# Patient Record
Sex: FEMALE | Race: WHITE | Employment: OTHER | URBAN - METROPOLITAN AREA
[De-identification: names, ages, dates, MRNs, and addresses within clinical notes are randomized per-mention and may not be internally consistent; named-entity substitution may affect disease eponyms.]

---

## 2020-01-02 ENCOUNTER — HOSPITAL ENCOUNTER (OUTPATIENT)
Age: 80
Discharge: HOME | End: 2020-01-02
Payer: MEDICARE

## 2020-01-02 ENCOUNTER — HOSPITAL ENCOUNTER (OUTPATIENT)
Dept: GENERAL RADIOLOGY | Age: 80
Discharge: HOME | End: 2020-01-02
Payer: MEDICARE

## 2020-01-02 ENCOUNTER — OFFICE VISIT (OUTPATIENT)
Dept: PRIMARY CARE CLINIC | Age: 80
End: 2020-01-02
Payer: MEDICARE

## 2020-01-02 VITALS
RESPIRATION RATE: 14 BRPM | SYSTOLIC BLOOD PRESSURE: 130 MMHG | HEIGHT: 61 IN | TEMPERATURE: 98 F | BODY MASS INDEX: 31.49 KG/M2 | DIASTOLIC BLOOD PRESSURE: 86 MMHG | OXYGEN SATURATION: 99 % | HEART RATE: 69 BPM | WEIGHT: 166.8 LBS

## 2020-01-02 DIAGNOSIS — R53.83 FATIGUE, UNSPECIFIED TYPE: ICD-10-CM

## 2020-01-02 DIAGNOSIS — R63.1 EXCESSIVE THIRST: ICD-10-CM

## 2020-01-02 DIAGNOSIS — R63.4 WEIGHT LOSS: ICD-10-CM

## 2020-01-02 DIAGNOSIS — Z83.3 FAMILY HISTORY OF DIABETES MELLITUS: ICD-10-CM

## 2020-01-02 DIAGNOSIS — F03.91 DEMENTIA WITH BEHAVIORAL DISTURBANCE, UNSPECIFIED DEMENTIA TYPE: ICD-10-CM

## 2020-01-02 LAB
A/G RATIO: 2.1 (ref 1.1–2.2)
ALBUMIN SERPL-MCNC: 4.8 G/DL (ref 3.4–5)
ALP BLD-CCNC: 87 U/L (ref 40–129)
ALT SERPL-CCNC: 16 U/L (ref 10–40)
ANION GAP SERPL CALCULATED.3IONS-SCNC: 15 MMOL/L (ref 3–16)
AST SERPL-CCNC: 21 U/L (ref 15–37)
BASOPHILS ABSOLUTE: 0 K/UL (ref 0–0.2)
BASOPHILS RELATIVE PERCENT: 0.6 %
BILIRUB SERPL-MCNC: 0.8 MG/DL (ref 0–1)
BILIRUBIN, POC: NORMAL
BLOOD URINE, POC: NORMAL
BUN BLDV-MCNC: 12 MG/DL (ref 7–20)
CALCIUM SERPL-MCNC: 9.6 MG/DL (ref 8.3–10.6)
CHLORIDE BLD-SCNC: 99 MMOL/L (ref 99–110)
CLARITY, POC: CLEAR
CO2: 26 MMOL/L (ref 21–32)
COLOR, POC: YELLOW
CREAT SERPL-MCNC: <0.5 MG/DL (ref 0.6–1.2)
EOSINOPHILS ABSOLUTE: 0.1 K/UL (ref 0–0.6)
EOSINOPHILS RELATIVE PERCENT: 0.8 %
GFR AFRICAN AMERICAN: >60
GFR NON-AFRICAN AMERICAN: >60
GLOBULIN: 2.3 G/DL
GLUCOSE BLD-MCNC: 96 MG/DL (ref 70–99)
GLUCOSE URINE, POC: NORMAL
HCT VFR BLD CALC: 41.6 % (ref 36–48)
HEMOGLOBIN: 13.8 G/DL (ref 12–16)
KETONES, POC: NORMAL
LEUKOCYTE EST, POC: NORMAL
LYMPHOCYTES ABSOLUTE: 1.7 K/UL (ref 1–5.1)
LYMPHOCYTES RELATIVE PERCENT: 22.3 %
MCH RBC QN AUTO: 30.5 PG (ref 26–34)
MCHC RBC AUTO-ENTMCNC: 33.3 G/DL (ref 31–36)
MCV RBC AUTO: 91.6 FL (ref 80–100)
MONOCYTES ABSOLUTE: 0.5 K/UL (ref 0–1.3)
MONOCYTES RELATIVE PERCENT: 6.8 %
NEUTROPHILS ABSOLUTE: 5.3 K/UL (ref 1.7–7.7)
NEUTROPHILS RELATIVE PERCENT: 69.5 %
NITRITE, POC: NORMAL
PDW BLD-RTO: 14.2 % (ref 12.4–15.4)
PH, POC: 6
PLATELET # BLD: 264 K/UL (ref 135–450)
PMV BLD AUTO: 9.4 FL (ref 5–10.5)
POTASSIUM SERPL-SCNC: 4.2 MMOL/L (ref 3.5–5.1)
PROTEIN, POC: NORMAL
RBC # BLD: 4.54 M/UL (ref 4–5.2)
SODIUM BLD-SCNC: 140 MMOL/L (ref 136–145)
SPECIFIC GRAVITY, POC: 1.03
TOTAL PROTEIN: 7.1 G/DL (ref 6.4–8.2)
TSH SERPL DL<=0.05 MIU/L-ACNC: 3.16 UIU/ML (ref 0.27–4.2)
UROBILINOGEN, POC: 0.2
VITAMIN B-12: 338 PG/ML (ref 211–911)
WBC # BLD: 7.6 K/UL (ref 4–11)

## 2020-01-02 PROCEDURE — 99204 OFFICE O/P NEW MOD 45 MIN: CPT | Performed by: INTERNAL MEDICINE

## 2020-01-02 PROCEDURE — 73562 X-RAY EXAM OF KNEE 3: CPT

## 2020-01-02 PROCEDURE — 81002 URINALYSIS NONAUTO W/O SCOPE: CPT | Performed by: INTERNAL MEDICINE

## 2020-01-02 RX ORDER — DONEPEZIL HYDROCHLORIDE 5 MG/1
5 TABLET, FILM COATED ORAL NIGHTLY
Qty: 30 TABLET | Refills: 1 | Status: SHIPPED | OUTPATIENT
Start: 2020-01-02 | End: 2020-01-28

## 2020-01-02 SDOH — HEALTH STABILITY: MENTAL HEALTH: HOW OFTEN DO YOU HAVE A DRINK CONTAINING ALCOHOL?: NEVER

## 2020-01-02 ASSESSMENT — PATIENT HEALTH QUESTIONNAIRE - PHQ9
1. LITTLE INTEREST OR PLEASURE IN DOING THINGS: 0
SUM OF ALL RESPONSES TO PHQ QUESTIONS 1-9: 0
SUM OF ALL RESPONSES TO PHQ QUESTIONS 1-9: 0
SUM OF ALL RESPONSES TO PHQ9 QUESTIONS 1 & 2: 0
2. FEELING DOWN, DEPRESSED OR HOPELESS: 0

## 2020-01-02 NOTE — PROGRESS NOTES
Vitals:    01/02/20 1035   BP: 130/86   Site: Right Upper Arm   Position: Sitting   Cuff Size: Medium Adult   Pulse: 69   Resp: 14   Temp: 98 °F (36.7 °C)   TempSrc: Oral   SpO2: 99%   Weight: 166 lb 12.8 oz (75.7 kg)   Height: 5' 1\" (1.549 m)     Body mass index is 31.52 kg/m². Physical Exam  Nursing note reviewed. Constitutional:       General: She is not in acute distress. Appearance: Normal appearance. She is well-developed. HENT:      Mouth/Throat:      Pharynx: Oropharynx is clear. Eyes:      General: Lids are normal.      Conjunctiva/sclera: Conjunctivae normal.      Pupils: Pupils are equal, round, and reactive to light. Neck:      Musculoskeletal: Neck supple. Thyroid: No thyromegaly. Vascular: No carotid bruit. Cardiovascular:      Rate and Rhythm: Normal rate and regular rhythm. Heart sounds: Normal heart sounds, S1 normal and S2 normal. No murmur. No friction rub. No gallop. Pulmonary:      Effort: Pulmonary effort is normal. No respiratory distress. Breath sounds: Normal breath sounds. No wheezing, rhonchi or rales. Abdominal:      General: Bowel sounds are normal. There is no distension. Palpations: Abdomen is soft. Tenderness: There is no tenderness. Musculoskeletal:      Right knee: She exhibits no swelling. No tenderness found. Left knee: She exhibits no swelling. No tenderness found. Right lower leg: No edema. Left lower leg: No edema. Comments: Crepitus bilateral knees   Lymphadenopathy:      Head:      Right side of head: No submandibular adenopathy. Left side of head: No submandibular adenopathy. Skin:     Comments: 12 x 12 mm raised mole left forehead   Neurological:      Mental Status: She is alert. She is disoriented.    Psychiatric:         Mood and Affect: Mood normal.           Results for POC orders placed in visit on 01/02/20   POCT Urinalysis no Micro   Result Value Ref Range    Color, UA Yellow

## 2020-01-02 NOTE — PATIENT INSTRUCTIONS
1. Pain in both knees, unspecified chronicity  - XR KNEE RIGHT (3 VIEWS); Future  - XR KNEE LEFT (3 VIEWS); Future  - diclofenac sodium 1 % GEL; Apply 2 g topically 2 times daily  Dispense: 100 g; Refill: 0    2. Fatigue, unspecified type  - CBC Auto Differential; Future  - Comprehensive Metabolic Panel; Future  - TSH without Reflex; Future  - Vitamin B12; Future  - multivitamin for Seniors once daily    3. Dementia with behavioral disturbance, unspecified dementia type (HCC)  - CBC Auto Differential; Future  - Comprehensive Metabolic Panel; Future  - TSH without Reflex; Future  - Vitamin B12; Future  -[facility]  - donepezil (ARICEPT) 5 MG tablet; Take 1 tablet by mouth nightly  Dispense: 30 tablet; Refill: 1  - Agree with [facility]    4. Excessive thirst  - Comprehensive Metabolic Panel; Future    5. Urinary frequency  -POCT Urinalysis no Micro    6. Change in skin mole  - Referral to [facility], MD, Dermatology, [facility]    7. Family history of diabetes mellitus  - Hemoglobin A1C; Future    8. Weight loss  - CBC Auto Differential; Future  - Comprehensive Metabolic Panel; Future  - TSH without Reflex;  Future  -Boost or Ensure nutritional shakes

## 2020-01-03 LAB
ESTIMATED AVERAGE GLUCOSE: 111.2 MG/DL
HBA1C MFR BLD: 5.5 %

## 2020-01-07 PROBLEM — M25.562 PAIN IN BOTH KNEES: Status: ACTIVE | Noted: 2020-01-07

## 2020-01-07 PROBLEM — R63.1 EXCESSIVE THIRST: Status: ACTIVE | Noted: 2020-01-07

## 2020-01-07 PROBLEM — R35.0 URINARY FREQUENCY: Status: ACTIVE | Noted: 2020-01-07

## 2020-01-07 PROBLEM — R63.4 WEIGHT LOSS: Status: ACTIVE | Noted: 2020-01-07

## 2020-01-07 PROBLEM — M25.561 PAIN IN BOTH KNEES: Status: ACTIVE | Noted: 2020-01-07

## 2020-01-07 PROBLEM — R53.83 FATIGUE: Status: ACTIVE | Noted: 2020-01-07

## 2020-01-07 PROBLEM — F03.918 DEMENTIA WITH BEHAVIORAL DISTURBANCE: Status: ACTIVE | Noted: 2020-01-07

## 2020-01-07 ASSESSMENT — ENCOUNTER SYMPTOMS
RHINORRHEA: 0
VOMITING: 0
SINUS PRESSURE: 0
DIARRHEA: 0
WHEEZING: 0
CHEST TIGHTNESS: 0
CONSTIPATION: 0
SORE THROAT: 0
NAUSEA: 0
TROUBLE SWALLOWING: 0
SHORTNESS OF BREATH: 0
COUGH: 0
BLOOD IN STOOL: 0
ABDOMINAL PAIN: 0

## 2020-01-13 ENCOUNTER — TELEPHONE (OUTPATIENT)
Dept: PRIMARY CARE CLINIC | Age: 80
End: 2020-01-13

## 2020-01-20 NOTE — TELEPHONE ENCOUNTER
Medication:   Requested Prescriptions     Pending Prescriptions Disp Refills    diclofenac sodium 1 % GEL [Pharmacy Med Name: DICLOFENAC SODIUM 1% GEL] 100 g 0     Sig: APPLY 2 G TOPICALLY 2 TIMES DAILY        Last Filled:      Patient Phone Number: [phone] (home)     Last appt: 1/2/2020   Next appt: 1/23/2020    Last OARRS: No flowsheet data found. Preferred Pharmacy:   [facility]/pharmacy [facility] [address], [address] [facility] [address] [address] [address]  Phone: [phone] Fax: [phone]    [facility] [address] - P [phone] - F [phone]  [address]   [place] [address]  Phone: [phone] Fax: [phone]

## 2020-01-23 ENCOUNTER — TELEPHONE (OUTPATIENT)
Dept: PRIMARY CARE CLINIC | Age: 80
End: 2020-01-23

## 2020-01-23 ENCOUNTER — OFFICE VISIT (OUTPATIENT)
Dept: PRIMARY CARE CLINIC | Age: 80
End: 2020-01-23
Payer: MEDICARE

## 2020-01-23 VITALS
BODY MASS INDEX: 32.02 KG/M2 | WEIGHT: 169.6 LBS | OXYGEN SATURATION: 95 % | HEIGHT: 61 IN | SYSTOLIC BLOOD PRESSURE: 106 MMHG | HEART RATE: 85 BPM | DIASTOLIC BLOOD PRESSURE: 80 MMHG

## 2020-01-23 PROBLEM — F02.818 ALZHEIMER'S DEMENTIA WITH BEHAVIORAL DISTURBANCE (HCC): Status: ACTIVE | Noted: 2020-01-23

## 2020-01-23 PROBLEM — G30.9 ALZHEIMER'S DEMENTIA WITH BEHAVIORAL DISTURBANCE (HCC): Status: ACTIVE | Noted: 2020-01-23

## 2020-01-23 PROCEDURE — 99213 OFFICE O/P EST LOW 20 MIN: CPT | Performed by: INTERNAL MEDICINE

## 2020-01-23 RX ORDER — DONEPEZIL HYDROCHLORIDE 10 MG/1
10 TABLET, FILM COATED ORAL DAILY
COMMUNITY
Start: 2020-01-22 | End: 2020-09-15 | Stop reason: SDUPTHER

## 2020-01-23 NOTE — TELEPHONE ENCOUNTER
1rst attempt contact.  VM left for POA
DASH/TLC (sodium and cholesterol restricted diet)/consistent carbohydrate (no snacks)/regular/low sodium

## 2020-01-24 RX ORDER — DONEPEZIL HYDROCHLORIDE 10 MG/1
10 TABLET, FILM COATED ORAL NIGHTLY
Qty: 30 TABLET | Refills: 5 | Status: SHIPPED | OUTPATIENT
Start: 2020-01-24

## 2020-01-24 RX ORDER — DONEPEZIL HYDROCHLORIDE 5 MG/1
TABLET, FILM COATED ORAL
Qty: 30 TABLET | Refills: 1 | OUTPATIENT
Start: 2020-01-24

## 2020-01-24 NOTE — TELEPHONE ENCOUNTER
Medication:   Requested Prescriptions     Pending Prescriptions Disp Refills    donepezil (ARICEPT) 5 MG tablet [Pharmacy Med Name: DONEPEZIL HCL 5 MG TABLET] 30 tablet 1     Sig: TAKE 1 TABLET BY MOUTH EVERY DAY AT NIGHT        Last Filled:      Patient Phone Number: [phone] (home)     Last appt: 1/23/2020   Next appt: Visit date not found    Last OARRS: No flowsheet data found. Preferred Pharmacy:   [facility]/pharmacy [facility] [address], [address] [address] [facility]  [address]  Phone: [phone] Fax: [phone]    [facility]/pharmacy [address] - P [phone] - F [phone]  [address]   [place] [address]  Phone: [phone] Fax: [phone]

## 2020-01-28 ASSESSMENT — ENCOUNTER SYMPTOMS
NAUSEA: 0
COUGH: 0
ABDOMINAL PAIN: 0
SORE THROAT: 0
SHORTNESS OF BREATH: 0
WHEEZING: 0
VOMITING: 0

## 2020-09-15 ENCOUNTER — HOSPITAL ENCOUNTER (OUTPATIENT)
Dept: GENERAL RADIOLOGY | Age: 80
Discharge: HOME | End: 2020-09-15
Payer: MEDICARE

## 2020-09-15 ENCOUNTER — TELEPHONE (OUTPATIENT)
Dept: DERMATOLOGY | Age: 80
End: 2020-09-15

## 2020-09-15 ENCOUNTER — HOSPITAL ENCOUNTER (OUTPATIENT)
Age: 80
Discharge: HOME | End: 2020-09-15
Payer: MEDICARE

## 2020-09-15 ENCOUNTER — OFFICE VISIT (OUTPATIENT)
Dept: PRIMARY CARE CLINIC | Age: 80
End: 2020-09-15
Payer: MEDICARE

## 2020-09-15 VITALS
BODY MASS INDEX: 34.96 KG/M2 | HEART RATE: 96 BPM | TEMPERATURE: 97.2 F | SYSTOLIC BLOOD PRESSURE: 142 MMHG | OXYGEN SATURATION: 97 % | WEIGHT: 185 LBS | DIASTOLIC BLOOD PRESSURE: 82 MMHG

## 2020-09-15 DIAGNOSIS — R11.0 NAUSEA: ICD-10-CM

## 2020-09-15 DIAGNOSIS — R10.816 EPIGASTRIC ABDOMINAL TENDERNESS WITHOUT REBOUND TENDERNESS: ICD-10-CM

## 2020-09-15 LAB
A/G RATIO: 1.8 (ref 1.1–2.2)
ALBUMIN SERPL-MCNC: 4.1 G/DL (ref 3.4–5)
ALP BLD-CCNC: 73 U/L (ref 40–129)
ALT SERPL-CCNC: 10 U/L (ref 10–40)
ANION GAP SERPL CALCULATED.3IONS-SCNC: 10 MMOL/L (ref 3–16)
AST SERPL-CCNC: 15 U/L (ref 15–37)
BASOPHILS ABSOLUTE: 0 K/UL (ref 0–0.2)
BASOPHILS RELATIVE PERCENT: 0.6 %
BILIRUB SERPL-MCNC: 0.6 MG/DL (ref 0–1)
BILIRUBIN, POC: NORMAL
BLOOD URINE, POC: NORMAL
BUN BLDV-MCNC: 9 MG/DL (ref 7–20)
CALCIUM SERPL-MCNC: 9.1 MG/DL (ref 8.3–10.6)
CHLORIDE BLD-SCNC: 102 MMOL/L (ref 99–110)
CLARITY, POC: CLEAR
CO2: 28 MMOL/L (ref 21–32)
COLOR, POC: YELLOW
CREAT SERPL-MCNC: 0.5 MG/DL (ref 0.6–1.2)
EOSINOPHILS ABSOLUTE: 0.1 K/UL (ref 0–0.6)
EOSINOPHILS RELATIVE PERCENT: 0.6 %
GFR AFRICAN AMERICAN: >60
GFR NON-AFRICAN AMERICAN: >60
GLOBULIN: 2.3 G/DL
GLUCOSE BLD-MCNC: 107 MG/DL (ref 70–99)
GLUCOSE URINE, POC: NORMAL
HCT VFR BLD CALC: 38 % (ref 36–48)
HEMOGLOBIN: 13.1 G/DL (ref 12–16)
KETONES, POC: NORMAL
LEUKOCYTE EST, POC: NORMAL
LIPASE: 44 U/L (ref 13–60)
LYMPHOCYTES ABSOLUTE: 2.2 K/UL (ref 1–5.1)
LYMPHOCYTES RELATIVE PERCENT: 25.9 %
MCH RBC QN AUTO: 31.3 PG (ref 26–34)
MCHC RBC AUTO-ENTMCNC: 34.5 G/DL (ref 31–36)
MCV RBC AUTO: 90.7 FL (ref 80–100)
MONOCYTES ABSOLUTE: 0.7 K/UL (ref 0–1.3)
MONOCYTES RELATIVE PERCENT: 8.2 %
NEUTROPHILS ABSOLUTE: 5.4 K/UL (ref 1.7–7.7)
NEUTROPHILS RELATIVE PERCENT: 64.7 %
NITRITE, POC: NORMAL
PDW BLD-RTO: 13.6 % (ref 12.4–15.4)
PH, POC: 6
PLATELET # BLD: 265 K/UL (ref 135–450)
PMV BLD AUTO: 9.2 FL (ref 5–10.5)
POTASSIUM SERPL-SCNC: 4.1 MMOL/L (ref 3.5–5.1)
PROTEIN, POC: NORMAL
RBC # BLD: 4.19 M/UL (ref 4–5.2)
SODIUM BLD-SCNC: 140 MMOL/L (ref 136–145)
SPECIFIC GRAVITY, POC: 1.02
TOTAL PROTEIN: 6.4 G/DL (ref 6.4–8.2)
UROBILINOGEN, POC: 0.2
WBC # BLD: 8.4 K/UL (ref 4–11)

## 2020-09-15 PROCEDURE — 72100 X-RAY EXAM L-S SPINE 2/3 VWS: CPT

## 2020-09-15 PROCEDURE — 72070 X-RAY EXAM THORAC SPINE 2VWS: CPT

## 2020-09-15 PROCEDURE — 81002 URINALYSIS NONAUTO W/O SCOPE: CPT | Performed by: INTERNAL MEDICINE

## 2020-09-15 PROCEDURE — 99214 OFFICE O/P EST MOD 30 MIN: CPT | Performed by: INTERNAL MEDICINE

## 2020-09-15 RX ORDER — DONEPEZIL HYDROCHLORIDE 10 MG/1
10 TABLET, FILM COATED ORAL DAILY
Qty: 30 TABLET | Refills: 3 | Status: SHIPPED | OUTPATIENT
Start: 2020-09-15 | End: 2020-10-15 | Stop reason: SDUPTHER

## 2020-09-15 RX ORDER — FAMOTIDINE 20 MG/1
20 TABLET, FILM COATED ORAL 2 TIMES DAILY
Qty: 60 TABLET | Refills: 0 | Status: SHIPPED | OUTPATIENT
Start: 2020-09-15 | End: 2020-10-10

## 2020-09-15 SDOH — ECONOMIC STABILITY: FOOD INSECURITY: WITHIN THE PAST 12 MONTHS, YOU WORRIED THAT YOUR FOOD WOULD RUN OUT BEFORE YOU GOT MONEY TO BUY MORE.: NEVER TRUE

## 2020-09-15 SDOH — ECONOMIC STABILITY: FOOD INSECURITY: WITHIN THE PAST 12 MONTHS, THE FOOD YOU BOUGHT JUST DIDN'T LAST AND YOU DIDN'T HAVE MONEY TO GET MORE.: NEVER TRUE

## 2020-09-15 SDOH — ECONOMIC STABILITY: INCOME INSECURITY: HOW HARD IS IT FOR YOU TO PAY FOR THE VERY BASICS LIKE FOOD, HOUSING, MEDICAL CARE, AND HEATING?: NOT HARD AT ALL

## 2020-09-15 SDOH — ECONOMIC STABILITY: TRANSPORTATION INSECURITY
IN THE PAST 12 MONTHS, HAS LACK OF TRANSPORTATION KEPT YOU FROM MEETINGS, WORK, OR FROM GETTING THINGS NEEDED FOR DAILY LIVING?: NO

## 2020-09-15 SDOH — ECONOMIC STABILITY: TRANSPORTATION INSECURITY
IN THE PAST 12 MONTHS, HAS THE LACK OF TRANSPORTATION KEPT YOU FROM MEDICAL APPOINTMENTS OR FROM GETTING MEDICATIONS?: NO

## 2020-09-15 NOTE — PROGRESS NOTES
frequency and hematuria. Musculoskeletal: Positive for back pain. Negative for arthralgias, gait problem, joint swelling and myalgias. Skin: Negative for rash. Neurological: Negative for dizziness, tremors, syncope, weakness, light-headedness, numbness and headaches. Psychiatric/Behavioral: Positive for confusion. Negative for dysphoric mood and sleep disturbance. The patient is not nervous/anxious. Allergies   Allergen Reactions    Sulfa Antibiotics Itching     Outpatient Medications Marked as Taking for the 9/15/20 encounter (Office Visit) with Aryan Rahman MD   Medication Sig Dispense Refill    donepezil (ARICEPT) 10 MG tablet Take 1 tablet by mouth nightly 30 tablet 5    diclofenac sodium 1 % GEL APPLY 2 G TOPICALLY 2 TIMES DAILY 100 g 2         Vitals:    09/15/20 0907 09/15/20 0920   BP: (!) 142/86 (!) 142/82   Site: Left Upper Arm Left Upper Arm   Position: Sitting Sitting   Cuff Size: Large Adult Large Adult   Pulse: 96    Temp: 97.2 °F (36.2 °C)    TempSrc: Infrared    SpO2: 97%    Weight: 185 lb (83.9 kg)      Body mass index is 34.96 kg/m². Physical Exam  Nursing note reviewed. Constitutional:       General: She is not in acute distress. Appearance: Normal appearance. She is well-developed. HENT:      Mouth/Throat:      Pharynx: Oropharynx is clear. Eyes:      General: Lids are normal.      Extraocular Movements: Extraocular movements intact. Conjunctiva/sclera: Conjunctivae normal.      Pupils: Pupils are equal, round, and reactive to light. Neck:      Musculoskeletal: Neck supple. Thyroid: No thyromegaly. Vascular: No carotid bruit. Cardiovascular:      Rate and Rhythm: Normal rate and regular rhythm. Heart sounds: Normal heart sounds, S1 normal and S2 normal. No murmur. No friction rub. No gallop. Pulmonary:      Effort: Pulmonary effort is normal. No respiratory distress. Breath sounds: Normal breath sounds.  No wheezing, rhonchi or rales.   Abdominal:      General: Bowel sounds are normal. There is no distension. Palpations: Abdomen is soft. Tenderness: There is no abdominal tenderness. Musculoskeletal:      Thoracic back: She exhibits no tenderness and no spasm. Lumbar back: She exhibits normal range of motion, no tenderness and no spasm. Right lower leg: No edema. Left lower leg: No edema. Lymphadenopathy:      Head:      Right side of head: No submandibular adenopathy. Left side of head: No submandibular adenopathy. Skin:     Comments: Toenails on both feet are long    Large mole left forhead   Neurological:      Mental Status: She is alert and oriented to person, place, and time. Gait: Gait normal.      Deep Tendon Reflexes: Reflexes are normal and symmetric. Psychiatric:         Attention and Perception: Attention normal.         Mood and Affect: Mood normal.         Cognition and Memory: Cognition is impaired. Memory is impaired. Results for POC orders placed in visit on 09/15/20   POCT Urinalysis no Micro   Result Value Ref Range    Color, UA yellow     Clarity, UA clear     Glucose, UA POC neg     Bilirubin, UA neg     Ketones, UA neg     Spec Grav, UA 1.025     Blood, UA POC neg     pH, UA 6.0     Protein, UA POC neg     Urobilinogen, UA 0.2     Leukocytes, UA trace     Nitrite, UA neg      Lab Review   No visits with results within 6 Month(s) from this visit.    Latest known visit with results is:   Orders Only on 01/02/2020   Component Date Value    Hemoglobin A1C 01/02/2020 5.5     eAG 01/02/2020 111.2     Vitamin B-12 01/02/2020 338     TSH 01/02/2020 3.16     Sodium 01/02/2020 140     Potassium 01/02/2020 4.2     Chloride 01/02/2020 99     CO2 01/02/2020 26     Anion Gap 01/02/2020 15     Glucose 01/02/2020 96     BUN 01/02/2020 12     CREATININE 01/02/2020 <0.5*    GFR Non- 01/02/2020 >60     GFR  01/02/2020 >60     Calcium 01/02/2020 9.6     Total Protein 01/02/2020 7.1     Alb 01/02/2020 4.8     Albumin/Globulin Ratio 01/02/2020 2.1     Total Bilirubin 01/02/2020 0.8     Alkaline Phosphatase 01/02/2020 87     ALT 01/02/2020 16     AST 01/02/2020 21     Globulin 01/02/2020 2.3     WBC 01/02/2020 7.6     RBC 01/02/2020 4.54     Hemoglobin 01/02/2020 13.8     Hematocrit 01/02/2020 41.6     MCV 01/02/2020 91.6     MCH 01/02/2020 30.5     MCHC 01/02/2020 33.3     RDW 01/02/2020 14.2     Platelets 62/78/8211 264     MPV 01/02/2020 9.4     Neutrophils % 01/02/2020 69.5     Lymphocytes % 01/02/2020 22.3     Monocytes % 01/02/2020 6.8     Eosinophils % 01/02/2020 0.8     Basophils % 01/02/2020 0.6     Neutrophils Absolute 01/02/2020 5.3     Lymphocytes Absolute 01/02/2020 1.7     Monocytes Absolute 01/02/2020 0.5     Eosinophils Absolute 01/02/2020 0.1     Basophils Absolute 01/02/2020 0.0          Assessment/Plan     1. Alzheimer's dementia with behavioral disturbance, unspecified timing of dementia [facility] ([facility]  - progressing  - Continue donepezil (ARICEPT) 10 MG tablet; Take 1 tablet by mouth daily  Dispense: 30 tablet; Refill: 3  -Family to continue to look for long term placement    2. Mid back pain  - XR THORACIC SPINE (2 VIEWS); Future  - Tylenol (Acetaminophen) 650mg 3 times daily as needed    3. Right low back pain, unspecified chronicity, unspecified whether sciatica present  - XR LUMBAR SPINE (2-3 VIEWS); Future  - POCT Urinalysis no Micro  - Tylenol (Acetaminophen) 650mg 3 times daily as needed    4. Heartburn  - famotidine (PEPCID) 20 MG tablet; Take 1 tablet by mouth 2 times daily  Dispense: 60 tablet; Refill: 0  - stop aspirin    5. Nausea  - CBC Auto Differential; Future  - Comprehensive Metabolic Panel; Future  - Lipase; Future  - POCT Urinalysis no Micro  - famotidine (PEPCID) 20 MG tablet; Take 1 tablet by mouth 2 times daily  Dispense: 60 tablet; Refill: 0  - US ABDOMEN COMPLETE; Future    6. Change in skin mole  -Referral to [facility], MD, Dermatology, [place]    7. Epigastric abdominal tenderness without rebound tenderness  - CBC Auto Differential; Future  - Comprehensive Metabolic Panel; Future  - US ABDOMEN COMPLETE; Future    8. Long toenail  - long toenails  -Referral to [facility], Flex Lopez DPM, Podiatry, Tamanna Holguin    9. Elevated blood pressure reading  - blood pressure is a little elevated  - patient will have BP checked at next visit in 2 weeks        Return in about 2 weeks (around 9/29/2020) for back pain, heartburn, nausea, and abdominal tenderness.

## 2020-09-15 NOTE — PATIENT INSTRUCTIONS
1. Alzheimer's dementia with behavioral disturbance, unspecified timing of dementia onset ([facility]  - progressing  - Continue donepezil (ARICEPT) 10 MG tablet; Take 1 tablet by mouth daily  Dispense: 30 tablet; Refill: 3  -Family to continue to look for long term placement    2. Mid back pain  - XR THORACIC SPINE (2 VIEWS); Future  - Tylenol (Acetaminophen) 650mg 3 times daily as needed    3. Right low back pain, unspecified chronicity, unspecified whether sciatica present  - XR LUMBAR SPINE (2-3 VIEWS); Future  - POCT Urinalysis no Micro  - Tylenol (Acetaminophen) 650mg 3 times daily as needed    4. Heartburn  - famotidine (PEPCID) 20 MG tablet; Take 1 tablet by mouth 2 times daily  Dispense: 60 tablet; Refill: 0  - stop aspirin    5. Nausea  - CBC Auto Differential; Future  - Comprehensive Metabolic Panel; Future  - Lipase; Future  - POCT Urinalysis no Micro  - famotidine (PEPCID) 20 MG tablet; Take 1 tablet by mouth 2 times daily  Dispense: 60 tablet; Refill: 0  - US ABDOMEN COMPLETE; Future    6. Change in skin mole  -Referral to [facility], MD, Dermatology, [facility]    7. Epigastric abdominal tenderness without rebound tenderness  - CBC Auto Differential; Future  - Comprehensive Metabolic Panel; Future  - US ABDOMEN COMPLETE; Future    8.  Long toenail  - long toenails  -Referral to [facility], Alanna Manning DPM, Podiatry, [facility]    9. Elevated blood pressure reading  - blood pressure is a little elevated  - patient will have BP checked at next visit in 2 weeks

## 2020-09-15 NOTE — TELEPHONE ENCOUNTER
Patient daughter called she seen Dr. Yulia Gomez and he referred her to us she has a mole that changing. She would be a new patient and I told Daughter Queta Barker that I would send a message through and see if Dr. Horton can see her sooner.   Patient's Daughter is Queta Barker her # is [phone]

## 2020-09-16 LAB — URINE CULTURE, ROUTINE: NORMAL

## 2020-09-21 ASSESSMENT — ENCOUNTER SYMPTOMS
SORE THROAT: 0
TROUBLE SWALLOWING: 0
WHEEZING: 0
VOMITING: 0
SHORTNESS OF BREATH: 0
CONSTIPATION: 0
SINUS PRESSURE: 0
DIARRHEA: 0
ABDOMINAL PAIN: 0
RHINORRHEA: 0
BACK PAIN: 1
NAUSEA: 1
BLOOD IN STOOL: 0
CHEST TIGHTNESS: 0
COUGH: 0

## 2020-09-22 ENCOUNTER — HOSPITAL ENCOUNTER (OUTPATIENT)
Dept: ULTRASOUND IMAGING | Age: 80
Discharge: HOME | End: 2020-09-22
Payer: MEDICARE

## 2020-09-22 PROCEDURE — 76700 US EXAM ABDOM COMPLETE: CPT

## 2020-09-23 ENCOUNTER — TELEPHONE (OUTPATIENT)
Dept: PRIMARY CARE CLINIC | Age: 80
End: 2020-09-23

## 2020-09-23 NOTE — TELEPHONE ENCOUNTER
Patients son returned a call from yesterday, he said to go ahead and call his cell phone number.      [phone]    Thank you

## 2020-09-29 ENCOUNTER — OFFICE VISIT (OUTPATIENT)
Dept: PRIMARY CARE CLINIC | Age: 80
End: 2020-09-29
Payer: MEDICARE

## 2020-09-29 VITALS
HEART RATE: 91 BPM | RESPIRATION RATE: 16 BRPM | OXYGEN SATURATION: 97 % | TEMPERATURE: 98.2 F | BODY MASS INDEX: 34.31 KG/M2 | DIASTOLIC BLOOD PRESSURE: 88 MMHG | WEIGHT: 181.6 LBS | SYSTOLIC BLOOD PRESSURE: 134 MMHG

## 2020-09-29 LAB — HBA1C MFR BLD: 5.7 %

## 2020-09-29 PROCEDURE — 83036 HEMOGLOBIN GLYCOSYLATED A1C: CPT | Performed by: INTERNAL MEDICINE

## 2020-09-29 PROCEDURE — G0008 ADMIN INFLUENZA VIRUS VAC: HCPCS | Performed by: INTERNAL MEDICINE

## 2020-09-29 PROCEDURE — 99214 OFFICE O/P EST MOD 30 MIN: CPT | Performed by: INTERNAL MEDICINE

## 2020-09-29 PROCEDURE — 90654 INFLUENZA, QUADV, ADJUVANTED, 65 YRS +, IM, PF, PREFILL SYR, 0.5ML (FLUAD): CPT | Performed by: INTERNAL MEDICINE

## 2020-09-29 RX ORDER — ALENDRONATE SODIUM 70 MG/1
70 TABLET ORAL
Qty: 4 TABLET | Refills: 3 | Status: SHIPPED | OUTPATIENT
Start: 2020-09-29 | End: 2021-02-01

## 2020-09-29 NOTE — PROGRESS NOTES
Vaccine Information Sheet, \"Influenza - Inactivated\"  given to [employer], or parent/legal guardian of  [employer] and verbalized understanding. Patient responses:    Have you ever had a reaction to a flu vaccine? No  Do you have any current illness? No  Have you ever had Guillian Buffalo Syndrome? No  Do you have a serious allergy to any of the follow: Neomycin, Polymyxin, Thimerosal, eggs or egg products? No    Flu vaccine given per order. Please see immunization tab. Risks and benefits explained. Current VIS given.

## 2020-09-29 NOTE — TELEPHONE ENCOUNTER
Daughter in law Nancy Maloney was called back due to no show policy the npt will not be seen was given name and # to [address] or instructed to contact insurance

## 2020-09-29 NOTE — PROGRESS NOTES
[facility]   Date ofBirth:  1940    Date of Visit:  9/29/2020    Chief Complaint   Patient presents with    Back Pain    Heartburn    Nausea       HPI  Back pain- Pt has right low back pain and mid back pain. Back pain is dull achy and intermittent. Pt states she has been taking Tylenol once daily which helps. Pt had xrays done which show degenerative disc disease lumbar spine and thoracic spine and compression deformity of L2 vertebrae. Heartburn- Pt takes Famotidine 20mg po bid. Pt states the Famotidine helps. Pt has stopped aspirin. Nausea- Pt states her nausea is better. Pt denies vomiting and abdominal pain. Pt had an abdominal ultrasound done which incidentally shows kidney cysts and angiomyolipoma of right kidney. Disorder of bone density and structure- Pt's xrays show osteopenia and compression. Pt has a history of osteoporosis and took Alendronate years ago. Review of Systems   Constitutional: Negative for chills, fatigue and fever. HENT: Negative for congestion, postnasal drip, rhinorrhea, sinus pressure and sore throat. Eyes: Negative for visual disturbance. Respiratory: Negative for cough, chest tightness, shortness of breath and wheezing. Cardiovascular: Negative for chest pain, palpitations and leg swelling. Gastrointestinal: Positive for nausea. Negative for abdominal pain, blood in stool, constipation, diarrhea and vomiting. Genitourinary: Negative for dysuria, frequency and hematuria. Musculoskeletal: Positive for back pain. Negative for arthralgias, gait problem and myalgias. Skin: Negative for rash. Neurological: Negative for dizziness, tremors, syncope, weakness, light-headedness, numbness and headaches. Psychiatric/Behavioral: Negative for dysphoric mood and sleep disturbance. The patient is not nervous/anxious.         Allergies   Allergen Reactions    Sulfa Antibiotics Itching     Outpatient Medications Marked as Taking for the 9/29/20 encounter (Office Visit) with Purvi Grissom MD   Medication Sig Dispense Refill    alendronate (FOSAMAX) 70 MG tablet Take 1 tablet by mouth every 7 days 4 tablet 3    donepezil (ARICEPT) 10 MG tablet Take 1 tablet by mouth daily 30 tablet 3    famotidine (PEPCID) 20 MG tablet Take 1 tablet by mouth 2 times daily 60 tablet 0    donepezil (ARICEPT) 10 MG tablet Take 1 tablet by mouth nightly 30 tablet 5    diclofenac sodium 1 % GEL APPLY 2 G TOPICALLY 2 TIMES DAILY 100 g 2         Vitals:    09/29/20 1148   BP: 134/88   Pulse: 91   Resp: 16   Temp: 98.2 °F (36.8 °C)   SpO2: 97%   Weight: 181 lb 9.6 oz (82.4 kg)     Body mass index is 34.31 kg/m². Physical Exam  Nursing note reviewed. Constitutional:       General: She is not in acute distress. Appearance: Normal appearance. She is well-developed. HENT:      Mouth/Throat:      Pharynx: Oropharynx is clear. Eyes:      General: Lids are normal.      Extraocular Movements: Extraocular movements intact. Conjunctiva/sclera: Conjunctivae normal.      Pupils: Pupils are equal, round, and reactive to light. Neck:      Musculoskeletal: Neck supple. Thyroid: No thyromegaly. Vascular: No carotid bruit. Cardiovascular:      Rate and Rhythm: Normal rate and regular rhythm. Heart sounds: Normal heart sounds, S1 normal and S2 normal. No murmur. No friction rub. No gallop. Pulmonary:      Effort: Pulmonary effort is normal. No respiratory distress. Breath sounds: Normal breath sounds. No wheezing, rhonchi or rales. Abdominal:      General: Bowel sounds are normal. There is no distension. Palpations: Abdomen is soft. Tenderness: There is no abdominal tenderness. Musculoskeletal:      Thoracic back: She exhibits tenderness. She exhibits no spasm. Lumbar back: She exhibits tenderness. She exhibits normal range of motion and no spasm. Right lower leg: No edema. Left lower leg: No edema. Lymphadenopathy:      Head:      Right side of head: No submandibular adenopathy. Left side of head: No submandibular adenopathy. Neurological:      Mental Status: She is alert and oriented to person, place, and time. Gait: Gait normal.      Deep Tendon Reflexes: Reflexes are normal and symmetric.    Psychiatric:         Attention and Perception: Attention normal.         Mood and Affect: Mood normal.           Results for POC orders placed in visit on 09/29/20   POCT glycosylated hemoglobin (Hb A1C)   Result Value Ref Range    Hemoglobin A1C 5.7 %     Lab Review   Orders Only on 09/15/2020   Component Date Value    Lipase 09/15/2020 44.0     Sodium 09/15/2020 140     Potassium 09/15/2020 4.1     Chloride 09/15/2020 102     CO2 09/15/2020 28     Anion Gap 09/15/2020 10     Glucose 09/15/2020 107*    BUN 09/15/2020 9     CREATININE 09/15/2020 0.5*    GFR Non- 09/15/2020 >60     GFR  09/15/2020 >60     Calcium 09/15/2020 9.1     Total Protein 09/15/2020 6.4     Alb 09/15/2020 4.1     Albumin/Globulin Ratio 09/15/2020 1.8     Total Bilirubin 09/15/2020 0.6     Alkaline Phosphatase 09/15/2020 73     ALT 09/15/2020 10     AST 09/15/2020 15     Globulin 09/15/2020 2.3     WBC 09/15/2020 8.4     RBC 09/15/2020 4.19     Hemoglobin 09/15/2020 13.1     Hematocrit 09/15/2020 38.0     MCV 09/15/2020 90.7     MCH 09/15/2020 31.3     MCHC 09/15/2020 34.5     RDW 09/15/2020 13.6     Platelets 39/86/0087 265     MPV 09/15/2020 9.2     Neutrophils % 09/15/2020 64.7     Lymphocytes % 09/15/2020 25.9     Monocytes % 09/15/2020 8.2     Eosinophils % 09/15/2020 0.6     Basophils % 09/15/2020 0.6     Neutrophils Absolute 09/15/2020 5.4     Lymphocytes Absolute 09/15/2020 2.2     Monocytes Absolute 09/15/2020 0.7     Eosinophils Absolute 09/15/2020 0.1     Basophils Absolute 09/15/2020 0.0    Office Visit on 09/15/2020   Component Date Value    Color, UA 09/15/2020 yellow     Clarity, UA 09/15/2020 clear     Glucose, UA POC 09/15/2020 neg     Bilirubin, UA 09/15/2020 neg     Ketones, UA 09/15/2020 neg     Spec Grav, UA 09/15/2020 1.025     Blood, UA POC 09/15/2020 neg     pH, UA 09/15/2020 6.0     Protein, UA POC 09/15/2020 neg     Urobilinogen, UA 09/15/2020 0.2     Leukocytes, UA 09/15/2020 trace     Nitrite, UA 09/15/2020 neg     Urine Culture, Routine 09/15/2020 No growth at 18 to 36 hours          Assessment/Plan     1. Right low back pain, unspecified chronicity, unspecified whether sciatica present  - Tylenol 650mg 3 times daily as needed  - Referral to Vianney Ng MD, Spine Surgery, [facility]    2. Mid back pain  - Tylenol 650mg 3 times daily as needed  - Referral to Vianney Ng MD, Spine Surgery, [facility]    3. Angiomyolipoma of right kidney  - seen on abdominal ultrasound  - [facility]    4. Hyperglycemia  - POCT glycosylated hemoglobin (Hb A1C)    5. Kidney cysts  - [facility]chal[facility]    6. Degenerative disc disease, thoracic  - Tylenol 650mg 3 times daily as needed  - [facility] [place]    7. Degenerative disc disease, lumbar  - Tylenol 650mg 3 times daily as needed  - [facility] [place]    8. Compression deformity of vertebra  - history of osteoporosis  - patient has taken Alendronate in the past years ago  - Start Alendronate 70mg once a week  - [facility] [place]     9. Disorder of bone density and structure, unspecified  - history of osteoporosis  - patient has taken Alendronate in the past years ago  - Start Alendronate 70mg once a week  - DEXA BONE DENSITY AXIAL SKELETON; Future    10. Heartburn  -improved  -Continue Famotidine 20mg 2 times daily    11. Nausea  -better  -labs and abdominal ultrasound are unremarkable for cause    12. Flu vaccine need  - INFLUENZA, QUADV, ADJUVANTED, 65 YRS =, IM, PF, PREFILL SYR, 0.5ML (FLUAD) given      Discussed medications with patient, who voiced understanding of their use and indications. All questions answered. Return in about 6 weeks (around 11/10/2020) for Medicare AWV.

## 2020-09-29 NOTE — PATIENT INSTRUCTIONS
Patient Education        Influenza (Flu) Vaccine (Inactivated or Recombinant): What You Need to Know  Why get vaccinated? Influenza vaccine can prevent influenza (flu). Flu is a contagious disease that spreads around the [place] every year, usually between October and May. Anyone can get the flu, but it is more dangerous for some people. Infants and young children, people 72years of age and older, pregnant women, and people with certain health conditions or a weakened immune system are at greatest risk of flu complications. Pneumonia, bronchitis, sinus infections and ear infections are examples of flu-related complications. If you have a medical condition, such as heart disease, cancer or diabetes, flu can make it worse. Flu can cause fever and chills, sore throat, muscle aches, fatigue, cough, headache, and runny or stuffy nose. Some people may have vomiting and diarrhea, though this is more common in children than adults. Each year, thousands of people in the [facility] die from flu, and many more are hospitalized. Flu vaccine prevents millions of illnesses and flu-related visits to the doctor each year. Influenza vaccine  CDC recommends everyone 10months of age and older get vaccinated every flu season. Children 6 months through 6years of age may need 2 doses during a single flu season. Everyone else needs only 1 dose each flu season. It takes about 2 weeks for protection to develop after vaccination. There are many flu viruses, and they are always changing. Each year a new flu vaccine is made to protect against three or four viruses that are likely to cause disease in the upcoming flu season. Even when the vaccine doesn't exactly match these viruses, it may still provide some protection. Influenza vaccine does not cause flu. Influenza vaccine may be given at the same time as other vaccines.   Talk with your health care provider  Tell your vaccine provider if the person getting the Consuelo Fritz MD, [facility], [facility]    4. Hyperglycemia  - POCT glycosylated hemoglobin (Hb A1C)    5. Kidney cysts  - Referral to Marisela Barrientos MD, [facility], [facility]    6. Degenerative disc disease, thoracic  - Tylenol 650mg 3 times daily as needed  - Referral to Abraham Ng MD, Spine Surgery, [facility]    7. Degenerative disc disease, lumbar  - Tylenol 650mg 3 times daily as needed  - Referral to Abraham Ng MD, Spine Surgery, [facility]    8. Compression deformity of vertebra  - history of osteoporosis  - patient has taken Alendronate in the past years ago  - Start Alendronate 70mg once a week  - Referral to Abraham Ng MD, Spine Surgery, [facility]     9. Disorder of bone density and structure, unspecified  - history of osteoporosis  - patient has taken Alendronate in the past years ago  - Start Alendronate 70mg once a week  - DEXA BONE DENSITY AXIAL SKELETON; Future    10. Heartburn  -improved  -Continue Famotidine 20mg 2 times daily    11. Nausea  -better  -labs and abdominal ultrasound are unremarkable for cause    12.  Flu vaccine need  - INFLUENZA, QUADV, ADJUVANTED, 65 YRS =, IM, PF, PREFILL SYR, 0.5ML (FLUAD) given

## 2020-09-30 ASSESSMENT — ENCOUNTER SYMPTOMS
DIARRHEA: 0
NAUSEA: 1
BACK PAIN: 1
RHINORRHEA: 0
SHORTNESS OF BREATH: 0
SINUS PRESSURE: 0
CHEST TIGHTNESS: 0
WHEEZING: 0
SORE THROAT: 0
VOMITING: 0
ABDOMINAL PAIN: 0
COUGH: 0
CONSTIPATION: 0
BLOOD IN STOOL: 0

## 2020-10-10 RX ORDER — FAMOTIDINE 20 MG/1
TABLET, FILM COATED ORAL
Qty: 60 TABLET | Refills: 3 | Status: SHIPPED | OUTPATIENT
Start: 2020-10-10

## 2020-10-10 NOTE — TELEPHONE ENCOUNTER
Medication:   Requested Prescriptions     Pending Prescriptions Disp Refills    famotidine (PEPCID) 20 MG tablet [Pharmacy Med Name: FAMOTIDINE 20 MG TABLET] 60 tablet 0     Sig: TAKE 1 TABLET BY MOUTH TWICE A DAY     Last Filled: 9.15.20    Last appt: 9/29/2020   Next appt: 11/10/2020    Last OARRS: No flowsheet data found.

## 2020-10-15 ENCOUNTER — OFFICE VISIT (OUTPATIENT)
Dept: ORTHOPEDIC SURGERY | Age: 80
End: 2020-10-15
Payer: MEDICARE

## 2020-10-15 VITALS — RESPIRATION RATE: 12 BRPM | WEIGHT: 181 LBS | TEMPERATURE: 97.4 F | BODY MASS INDEX: 34.17 KG/M2 | HEIGHT: 61 IN

## 2020-10-15 PROCEDURE — 99203 OFFICE O/P NEW LOW 30 MIN: CPT | Performed by: PHYSICIAN ASSISTANT

## 2020-10-15 NOTE — PROGRESS NOTES
New Patient: SPINE    Referring Provider:  Marisela Fried MD    Chief Complaint   Patient presents with    Lower Back Pain     NP, LSP       HISTORY OF PRESENT ILLNESS:      · The patient is being sent at the request of Marisela Fried MD in consultation as a new spine patient for low back pain. The patient is a 79 y.o. female whom reports symptoms for 1 year. Symptoms progressed over the last  1 year. Patient reports there was not a significant event to cause the symptoms. Today discomfort is report at 4 out of 10, describing it as aching. Symptoms are aggravated by: bending. Patient has undergone recent treatment including, Tylenol. Patient denies previous lumbar spine surgery. · The patient presents today as a new patient with lower back pain. The patient presents in the office with her son as the patient does have Alzheimer's and majority of the history does come from him today. The patient is having mid back pain as well and describes that she does not have any radicular features at this time. The patient and son describe that this pain has been present for the past year and has consistently worsened. The patient describes that bending particularly bending backwards increases her pain significantly. The patient has tried over-the-counter medications including Tylenol. She has not tried any physical therapy although due to the patient's mental status and overall state of being I do not believe that this would be benefit for her.     Pain Assessment  Location of Pain: Back(LSP)  Location Modifiers: Posterior  Severity of Pain: 4  Quality of Pain: Aching  Duration of Pain: Persistent  Frequency of Pain: Constant  Aggravating Factors: Bending  Limiting Behavior: Yes  Relieving Factors: Rest, Other (Comment)(Tylenol)  Result of Injury: No  Work-Related Injury: No  Are there other pain locations you wish to document?: No      Associated signs and symptoms:   Neurogenic bowel or bladder symptoms: no   Perceived weakness:  yes   Difficulty walking:  yes    Recent Imaging (within past one year)   Xrays: yes   MRI or CT of spine: no    Current/Past Treatment:   · Physical Therapy:  none  · Chiropractic:  none  · Injection:  none  · Medications:   NSAIDS:  yes   Muscle relaxer:  none   Steriods:  none   Neuropathic medications:  none   Opioids:  none  · Previous surgery:  no  · Previous surgical consult:  no  · Other:  · Infection control  · Tested positive for MRSA in past 12 months:  no  · Tested positive for MSSA \"staph infection\" in past 12 months: no  · Tested positive for VRE (Vancomycin Resistant Enterococci) in past 12 months:   no  · Currently on any antibiotics for an infection: no  · Anticoagulants:  · On a blood thinner:  no   · Any history of bleeding disorder: no   · MRI Contraindication: no   · Previous Pain Management: no   · Goal for treatment: Reduce pain / improve function  · How long can you stand? 30 minutes    Sit? No limitations     Walk? 30 minutes       Past medical, surgical, social and family history reviewed with the patient. Past Medical History:   Past Medical History:   Diagnosis Date    Alzheimer's dementia with behavioral disturbance ([facility] [address] 1/23/2020      Past Surgical History:     Past Surgical History:   Procedure Laterality Date    SALPINGECTOMY Right      Current Medications:     Current Outpatient Medications:     famotidine (PEPCID) 20 MG tablet, TAKE 1 TABLET BY MOUTH TWICE A DAY, Disp: 60 tablet, Rfl: 3    alendronate (FOSAMAX) 70 MG tablet, Take 1 tablet by mouth every 7 days, Disp: 4 tablet, Rfl: 3    donepezil (ARICEPT) 10 MG tablet, Take 1 tablet by mouth nightly, Disp: 30 tablet, Rfl: 5    diclofenac sodium 1 % GEL, APPLY 2 G TOPICALLY 2 TIMES DAILY, Disp: 100 g, Rfl: 2  Allergies:  Sulfa antibiotics  Social History:    reports that she has never smoked.  She has never used smokeless tobacco. She reports that she does not drink alcohol or use drugs. Family History:   Family History   Problem Relation Age of Onset    Osteoarthritis Mother     Osteoarthritis Father        REVIEW OF SYSTEMS: ROS - 14 point    Constitutional: No fevers, chills, night sweats, unexplained weight loss  Eye: No vision changes or diplopia  ENT: No nasal congestion, postnasal drip or sore throat. No tinnitus  Respiratory: No cough or SOB  CV: No chest pain or palpitations  GI: No nausea, abdominal pain, stool changes  : No dysuria or hematuria  Skin: No new or changing skin lesions, no rashes  MSK: No joint swelling, morning stiffness, unusual joint pain. + mid to lower back pain  Neurological: No headache, + confusion, no syncope  Psychiatric: No excessive anxiety or depression  Endocrine: No polyuria or polydipsia  Hematologic: No lymph node enlargement or excessive bleeding  Immunologic:No history of immune deficiency or immunomodulating drugs           PHYSICAL EXAM:    Vitals: Temperature 97.4 °F (36.3 °C), resp. rate 12, height 5' 1\" (1.549 m), weight 181 lb (82.1 kg). GENERAL EXAM:  · General Apparence: Patient is adequately groomed with no evidence of malnutrition. · Psychiatric: Orientation: The patient is oriented to time, place and person. The patient's mood and affect are appropriate   · Vascular: Examination reveals no swelling and palpation reveals no tenderness in upper or lower extremities. Good capillary refill. · The lymphatic examination of the neck, axillae and groin reveals all areas to be without enlargement or induration   Sensation is intact without deficit in the upper and lower extremities to light touch and pinprick  · Coordination of the upper and lower extremities are normal.    CERVICAL EXAMINATION:  · Inspection: Local inspection shows no step-off or bruising. Cervical alignment is normal. No instability is noted. · Palpation and Percussion: No evidence of tenderness at the midline. Paraspinal tenderness is present.  There is no paraspinal spasm. · Range of Motion:  pain-free ROM   · Strength: 5/5 bilateral upper extremities  · Special Tests:   Spurling's and Sheffield's are negative bilaterally. Stark and Impingement tests are negative bilaterally. · Skin:There are no rashes, ulcerations or lesions. · Reflexes: Bilaterally triceps, biceps and brachioradialis are 2+. Clonus absent bilaterally at the feet. No pathological reflexes are noted. · Gait & station:  shuffle type gait with no ataxia. · Additional Examinations:  · RIGHT UPPER EXTREMITY:  Inspection/examination of the right upper extremity does not show any tenderness, deformity or injury. Range of motion is normal and pain-free. There is no gross instability. There are no rashes, ulcerations or lesions. Strength and tone are normal. No atrophy or abnormal movements are noted. · LEFT UPPER EXTREMITY: Inspection/examination of the left upper extremity does not show any tenderness, deformity or injury. Range of motion is normal and pain-free. There is no gross instability. There are no rashes, ulcerations or lesions. Strength and tone are normal. No atrophy or abnormal movements are noted. LUMBAR/SACRAL EXAMINATION:  · Inspection: Local inspection shows no step-off or bruising. Lumbar alignment is normal. No instability is noted. · Palpation:   No evidence of tenderness at the midline. Lumbar paraspinal tenderness Mild L4/5 and L5/S1 tenderness with moderate to severe thoracic spine tenderness. Bursal tenderness No tenderness bilaterally  There is no paraspinal spasm. · Range of Motion: limited by 50% in all planes due to pain specifically with extension. · Strength:   Strength testing is 5/5 in all muscle groups tested. · Special Tests:   Straight leg raise and crossed SLR negative. Joseluis's testing is negative bilaterally. FADIR's testing is negative bilaterally. Slump test negative.  Bowstring test negative  · Skin: There are no rashes, ulcerations or lesions. · Reflexes: Reflexes are symmetrically 2+ at the patellar and ankle tendons. Clonus absent bilaterally at the feet. · Gait & station: normal, patient ambulates without assistance and no ataxia  · Additional Examinations:  · RIGHT LOWER EXTREMITY: Inspection/examination of the right lower extremity does not show any tenderness, deformity or injury. Range of motion is unremarkable. There is no gross instability. There are no rashes, ulcerations or lesions. Strength and tone are normal. No atrophy or abnormal movements are noted. · LEFT LOWER EXTREMITY:  Inspection/examination of the left lower extremity does not show any tenderness, deformity or injury. Range of motion is unremarkable. There is no gross instability. There are no rashes, ulcerations or lesions. Strength and tone are normal. No atrophy or abnormal movements are noted. Diagnostic Testing:   Xr Thoracic Spine (2 Views)    Result Date: 9/15/2020  1. Mild L2 superior endplate compression deformity, degenerative or traumatic and age indeterminate. Correlate with point tenderness. 2.  No thoracic vertebral compression deformity. 3.  Degenerative disc disease and degenerative joint disease. Xr Lumbar Spine (2-3 Views)    Result Date: 9/15/2020  1. Mild L2 superior endplate compression deformity, degenerative or traumatic and age indeterminate. Correlate with point tenderness. 2.  No thoracic vertebral compression deformity. 3.  Degenerative disc disease and degenerative joint disease. MRI or CT:  None     EMG:  None     Results for orders placed or performed in visit on 09/29/20   POCT glycosylated hemoglobin (Hb A1C)   Result Value Ref Range    Hemoglobin A1C 5.7 %         Impression (Medical Decision Making):       1. DDD (degenerative disc disease), thoracic    2. Spondylosis without myelopathy or radiculopathy, thoracic region    3. Spondylosis without myelopathy or radiculopathy, lumbar region    4.  DDD (degenerative disc disease), lumbar    5. Closed compression fracture of L2 vertebra, initial encounter [facility])        Plan (Medical Decision Making):    I discussed the diagnosis and the treatment options with Jennifer Blue today. In Summary:  The various treatment options were outlined and discussed with Jennifer Blue including:  Conservative care options: physical therapy, ice, medications, bracing, and activity modification. The indications for therapeutic injections. The indications for additional imaging/laboratory studies. The indications for (possible future) interventions. After considering the various options discussed, Jennifer Blue elected to pursue a course of treatment that includes the followin. Medications: No further recommendations for new medications. The patient can continue to take Tylenol at this time. 2. PT:  Encouraged to continue with Home exercise program.    3. Further studies: Setup Thoracic MR without contrast with extension to L2 in the lumbar spine to evaluate for soft tissue pathology or stenosis contributing to the back pain and paresthesia. 4. Interventional:  After further imaging is obtained, interventional options will be reviewed and recommended. 5. Healthy Lifestyle Measures:  Patient education material reviewing the following was distributed to Jennifer Blue  Anatomic drawings  Healthy lifestyle education  Osteoporosis prevention,   Back and neck pain educational information   Advanced imaging preparedness    Posture education   Proper lifting and carrying techniques,   Weight management  Quitting smoking and   Minor ways to treat back pain  For further information regarding the spine conditions and to review interventional treatments the patient was directed to [facility].    6.  Follow up:  1-2 weeks    Jennifer Blue was instructed to call the office if her symptoms worsen or if new symptoms appear prior to the next scheduled visit. She is specifically instructed to contact the office between now & her scheduled appointment if she has concerns related to her condition or if she needs assistance in scheduling the above tests. She is welcome to call for an appointment sooner if she has any additional concerns or questions. Eli SAMUELS am scribing for [employer]. [address], [place].  10/15/20 10:22 AM Eli Zhang. The physical examination was performed between the patient and [employer] ROBBY Jones. All counseling during the appointment was performed between the patient and the provider. Philip Brito PA-C, personally performed the services described in this documentation as scribed by Eli Jon ATC in my presence and it is both accurate and complete. LIZ Carlin PA-C  Board Certified by the [employer] on Certification of Physician Assistants  [id number]  Partner of [facility] ([facility])           This dictation was performed with a verbal recognition program [facility] ) and it was checked for errors. It is possible that there are still dictated errors within this office note. If so, please bring any errors to my attention for an addendum. All efforts were made to ensure that this office note is accurate.

## 2020-10-19 ENCOUNTER — TELEPHONE (OUTPATIENT)
Dept: ORTHOPEDIC SURGERY | Age: 80
End: 2020-10-19

## 2020-10-19 NOTE — TELEPHONE ENCOUNTER
L/m on patient's son's v/m (POA) regarding MRI TSP approval and authorization being valid until 12/14/2020. Patient was instructed to call [place] to schedule MRI TSP, then contact our office for follow up appointment. MRI TSP results will not be given over the phone or via [employer]. Patient was also asked to allow a minimum 48 hours for follow up appointment from MRI scan in order for staff to obtain MRI report. Patient currently has a follow up [place] schedule for [place] [facility] Advised to contact the office if needing to reschedule this to accommodate MRI scan.

## 2020-11-05 ENCOUNTER — HOSPITAL ENCOUNTER (OUTPATIENT)
Dept: GENERAL RADIOLOGY | Age: 80
Discharge: HOME | End: 2020-11-05
Payer: MEDICARE

## 2020-11-05 PROCEDURE — 77080 DXA BONE DENSITY AXIAL: CPT

## 2020-11-09 ENCOUNTER — TELEPHONE (OUTPATIENT)
Dept: ORTHOPEDIC SURGERY | Age: 80
End: 2020-11-09

## 2020-11-09 NOTE — TELEPHONE ENCOUNTER
The patient's son called to state that his mom could not complete her MRI and is asking that her conclusion from her last appt (and or MRI) be sent to her primary care physician, Aurelia Hill.

## 2020-11-10 ENCOUNTER — OFFICE VISIT (OUTPATIENT)
Dept: PRIMARY CARE CLINIC | Age: 80
End: 2020-11-10
Payer: MEDICARE

## 2020-11-10 ENCOUNTER — TELEPHONE (OUTPATIENT)
Dept: ADMINISTRATIVE | Age: 80
End: 2020-11-10

## 2020-11-10 ENCOUNTER — HOSPITAL ENCOUNTER (OUTPATIENT)
Dept: GENERAL RADIOLOGY | Age: 80
Discharge: HOME | End: 2020-11-10
Payer: MEDICARE

## 2020-11-10 ENCOUNTER — HOSPITAL ENCOUNTER (OUTPATIENT)
Age: 80
Discharge: HOME | End: 2020-11-10
Payer: MEDICARE

## 2020-11-10 VITALS
TEMPERATURE: 97.8 F | BODY MASS INDEX: 32.47 KG/M2 | SYSTOLIC BLOOD PRESSURE: 138 MMHG | WEIGHT: 172 LBS | HEART RATE: 76 BPM | HEIGHT: 61 IN | OXYGEN SATURATION: 95 % | DIASTOLIC BLOOD PRESSURE: 82 MMHG

## 2020-11-10 PROCEDURE — 73060 X-RAY EXAM OF HUMERUS: CPT

## 2020-11-10 PROCEDURE — G0438 PPPS, INITIAL VISIT: HCPCS | Performed by: INTERNAL MEDICINE

## 2020-11-10 PROCEDURE — G8431 POS CLIN DEPRES SCRN F/U DOC: HCPCS | Performed by: INTERNAL MEDICINE

## 2020-11-10 RX ORDER — SERTRALINE HYDROCHLORIDE 25 MG/1
25 TABLET, FILM COATED ORAL DAILY
Qty: 30 TABLET | Refills: 0 | Status: SHIPPED | OUTPATIENT
Start: 2020-11-10 | End: 2020-12-07

## 2020-11-10 ASSESSMENT — LIFESTYLE VARIABLES
HOW MANY STANDARD DRINKS CONTAINING ALCOHOL DO YOU HAVE ON A TYPICAL DAY: 0
HAVE YOU OR SOMEONE ELSE BEEN INJURED AS A RESULT OF YOUR DRINKING: 0
HOW OFTEN DURING THE LAST YEAR HAVE YOU FAILED TO DO WHAT WAS NORMALLY EXPECTED FROM YOU BECAUSE OF DRINKING: 0
HOW OFTEN DURING THE LAST YEAR HAVE YOU NEEDED AN ALCOHOLIC DRINK FIRST THING IN THE MORNING TO GET YOURSELF GOING AFTER A NIGHT OF HEAVY DRINKING: 0
HOW OFTEN DURING THE LAST YEAR HAVE YOU FOUND THAT YOU WERE NOT ABLE TO STOP DRINKING ONCE YOU HAD STARTED: 0
AUDIT TOTAL SCORE: 1
HOW OFTEN DO YOU HAVE SIX OR MORE DRINKS ON ONE OCCASION: 0
HOW OFTEN DO YOU HAVE A DRINK CONTAINING ALCOHOL: 1
HAS A RELATIVE, FRIEND, DOCTOR, OR ANOTHER HEALTH PROFESSIONAL EXPRESSED CONCERN ABOUT YOUR DRINKING OR SUGGESTED YOU CUT DOWN: 0
AUDIT-C TOTAL SCORE: 1
HOW OFTEN DURING THE LAST YEAR HAVE YOU BEEN UNABLE TO REMEMBER WHAT HAPPENED THE NIGHT BEFORE BECAUSE YOU HAD BEEN DRINKING: 0
HOW OFTEN DURING THE LAST YEAR HAVE YOU HAD A FEELING OF GUILT OR REMORSE AFTER DRINKING: 0

## 2020-11-10 ASSESSMENT — PATIENT HEALTH QUESTIONNAIRE - PHQ9
5. POOR APPETITE OR OVEREATING: 3
2. FEELING DOWN, DEPRESSED OR HOPELESS: 2
SUM OF ALL RESPONSES TO PHQ QUESTIONS 1-9: 21
3. TROUBLE FALLING OR STAYING ASLEEP: 3
6. FEELING BAD ABOUT YOURSELF - OR THAT YOU ARE A FAILURE OR HAVE LET YOURSELF OR YOUR FAMILY DOWN: 3
1. LITTLE INTEREST OR PLEASURE IN DOING THINGS: 1
SUM OF ALL RESPONSES TO PHQ QUESTIONS 1-9: 21
SUM OF ALL RESPONSES TO PHQ QUESTIONS 1-9: 21
10. IF YOU CHECKED OFF ANY PROBLEMS, HOW DIFFICULT HAVE THESE PROBLEMS MADE IT FOR YOU TO DO YOUR WORK, TAKE CARE OF THINGS AT HOME, OR GET ALONG WITH OTHER PEOPLE: 3
4. FEELING TIRED OR HAVING LITTLE ENERGY: 3
SUM OF ALL RESPONSES TO PHQ9 QUESTIONS 1 & 2: 3
7. TROUBLE CONCENTRATING ON THINGS, SUCH AS READING THE NEWSPAPER OR WATCHING TELEVISION: 3
9. THOUGHTS THAT YOU WOULD BE BETTER OFF DEAD, OR OF HURTING YOURSELF: 0
8. MOVING OR SPEAKING SO SLOWLY THAT OTHER PEOPLE COULD HAVE NOTICED. OR THE OPPOSITE, BEING SO FIGETY OR RESTLESS THAT YOU HAVE BEEN MOVING AROUND A LOT MORE THAN USUAL: 3

## 2020-11-10 ASSESSMENT — COLUMBIA-SUICIDE SEVERITY RATING SCALE - C-SSRS
2. HAVE YOU ACTUALLY HAD ANY THOUGHTS OF KILLING YOURSELF?: NO
1. WITHIN THE PAST MONTH, HAVE YOU WISHED YOU WERE DEAD OR WISHED YOU COULD GO TO SLEEP AND NOT WAKE UP?: NO
6. HAVE YOU EVER DONE ANYTHING, STARTED TO DO ANYTHING, OR PREPARED TO DO ANYTHING TO END YOUR LIFE?: NO

## 2020-11-10 NOTE — PROGRESS NOTES
Medicare Annual Wellness Visit  Name: Martin Vasquez Date: 11/10/2020   MRN: <[id number]> Sex: Female   Age: 79 y.o. Ethnicity: Non-/Non    : 1940 Race: Khris Cruz is here for [facility]    Screenings for behavioral, psychosocial and functional/safety risks, and cognitive dysfunction are all negative except as indicated below. These results, as well as other patient data from the [address] form, are documented in Flowsheets linked to this Encounter. Allergies   Allergen Reactions    Sulfa Antibiotics Itching         Prior to Visit Medications    Medication Sig Taking?  Authorizing Provider   sertraline (ZOLOFT) 25 MG tablet Take 1 tablet by mouth daily Yes Radha Weiner MD   famotidine (PEPCID) 20 MG tablet TAKE 1 TABLET BY MOUTH TWICE A DAY Yes Radha Weiner MD   alendronate (FOSAMAX) 70 MG tablet Take 1 tablet by mouth every 7 days Yes Radha Weiner MD   donepezil (ARICEPT) 10 MG tablet Take 1 tablet by mouth nightly Yes Radha Weiner MD   diclofenac sodium 1 % GEL APPLY 2 G TOPICALLY 2 TIMES DAILY Yes Radha Weiner MD         Past Medical History:   Diagnosis Date    Alzheimer's dementia with behavioral disturbance ([facility] [address] 2020       Past Surgical History:   Procedure Laterality Date    SALPINGECTOMY Right          Family History   Problem Relation Age of Onset    Osteoarthritis Mother     Osteoarthritis Father        CareTeam (Including outside providers/suppliers regularly involved in providing care):   Patient Care Team:  Radha Weiner MD as PCP - General (Internal Medicine)  Radha Weiner MD as PCP - [facility] Leah Eliseled Provider    Wt Readings from Last 3 Encounters:   11/10/20 172 lb (78 kg)   10/15/20 181 lb (82.1 kg)   20 181 lb 9.6 oz (82.4 kg)     Vitals:    11/10/20 1109   BP: 138/82   Pulse: 76   Temp: 97.8 °F (36.6 °C)   TempSrc: Oral   SpO2: 95%   Weight: 172 lb (78 kg)   Height: 5' 1\" (1.549 m)     Body mass index is 32.5 kg/m². Based upon direct observation of the patient, evaluation of cognition reveals remote memory intact, recent memory impaired. General Appearance: alert and oriented to person, place and time, well-developed and well-nourished, in no acute distress  Head: normocephalic and atraumatic  Eyes: pupils equal, round, and reactive to light, extraocular eye movements intact, conjunctivae normal  ENT: tympanic membrane, external ear and ear canal normal bilaterally, oropharynx clear and moist with normal mucous membranes  Neck: neck supple and non tender without mass, no thyromegaly or thyroid nodules, no cervical lymphadenopathy   Pulmonary/Chest: clear to auscultation bilaterally- no wheezes, rales or rhonchi, normal air movement, no respiratory distress  Cardiovascular: normal rate, regular rhythm, normal S1 and S2, no murmurs and no carotid bruits  Abdomen: soft, non-tender, non-distended, normal bowel sounds, no masses or organomegaly  Extremities: no edema  Musculoskeletal: normal range of motion, no joint swelling, right upper arm tender, no swelling  Neurologic: reflexes normal and symmetric, no cranial nerve deficit, gait, coordination and speech normal    Patient's complete Health Risk Assessment and screening values have been reviewed and are found in Flowsheets. The following problems were reviewed today and where indicated follow up appointments were made and/or referrals ordered. Positive Risk Factor Screenings with Interventions:     Depression:  PHQ-2 Score: 3  PHQ-9 Total Score: 21    Severity:1-4 = minimal depression, 5-9 = mild depression, 10-14 = moderate depression, 15-19 = moderately severe depression, 20-27 = severe depression  Depression Interventions:  · Medication prescribed- Sertraline 25mg po q day     Pt states she wishes she didn't have to work and wished she had a lot of money  Looking at long term care for Spring 2021.  Pt is anxious about having to move. Pt feels sad and down  No crying spells. Daughter in law is a Therapist and thinks pt may need medication  [address] and ACP:  General  In general, how would you say your health is?: Fair  In the past 7 days, have you experienced any of the following? New or Increased Pain, New or Increased Fatigue, Loneliness, Social Isolation, Stress or Anger?: (!) New or Increased Pain(arm  pain)  Do you get the social and emotional support that you need?: (!) No  Do you have a Living Will?: Yes  Advance Directives     Power of  Living Will ACP-Advance Directive ACP-Power of     Not on File Not on File Filed [facility] Risk Interventions:  · No Living Will: ACP documents already completed- patient asked to provide copy to the office     Patient c/o pain deep inside her right upper arm x 2 weeks. Pt denies injury. Pt states pain is dull. Pt takes aspirin. Health Habits/Nutrition:  Health Habits/Nutrition  Do you exercise for at least 20 minutes 2-3 times per week?: (!) No  Have you lost any weight without trying in the past 3 months?: (!) Yes  Do you eat fewer than 2 meals per day?: No  Have you seen a dentist within the past year?: (!) No  Body mass index: (!) 32.5  Health Habits/Nutrition Interventions:  · Inadequate physical activity:  patient is not ready to increase his/her physical activity level at this time  · Nutritional issues:  patient is not ready to address his/her nutritional/weight issues at this time  · Dental exam overdue:  patient encouraged to make appointment with his/her dentist     Pt's daughter in-law states patient will make food and throws it away and only eats a tiny bit. Pt is also doing meals on wheels. Pt states she is hungry but doesn't like the food. Pt's daughter in-law states pt's son does buy her foods she likes.       Hearing/Vision:  No exam data present  Hearing/Vision  Do you or your family notice any trouble with your hearing?: No  Do you have difficulty driving, watching TV, or doing any of your daily activities because of your eyesight?: No  Have you had an eye exam within the past year?: (!) No  Hearing/Vision Interventions:  · Vision concerns:  patient encouraged to make appointment with his/her eye specialist    ADL:  ADLs  In the past 7 days, did you need help from others to perform any of the following everyday activities? Eating, dressing, grooming, bathing, toileting, or walking/balance?: (!) Dressing, Grooming, Bathing  In the past 7 days, did you need help from others to take care of any of the following? Laundry, housekeeping, banking/finances, shopping, telephone use, food preparation, transportation, or taking medications?: [employer], Housekeeping, Banking/Finances, Shopping, Transportation, Taking Medications, None  ADL Interventions:  · Patient has an Aide 4 times per week and her Son and Daughter in-law provide assistance. Personalized Preventive Plan   Current Health Maintenance Status  Immunization History   Administered Date(s) Administered    Influenza, Quadv, adjuvanted, 72 yrs +, IM, PF (Fluad) 09/29/2020        Health Maintenance   Topic Date Due    Annual Wellness Visit (AWV)  11/07/2019    DTaP/Tdap/Td vaccine (1 - Tdap) 01/02/2021 (Originally 9/15/1959)    Shingles Vaccine (1 of 2) 01/02/2021 (Originally 9/15/1989)    Pneumococcal 65+ years Vaccine (1 of 1 - PPSV23) 01/02/2021 (Originally 9/15/2004)    DEXA (modify frequency per FRAX score)  Completed    Flu vaccine  Completed    Hepatitis A vaccine  Aged Out    Hepatitis B vaccine  Aged Out    Hib vaccine  Aged Out    Meningococcal (ACWY) vaccine  Aged Out     Recommendations for [place] Due: see orders and patient instructions/AVS.  . Recommended screening schedule for the next 5-10 years is provided to the patient in written form: see Patient Erica Rader was seen today for medicare awv.     Diagnoses and all orders for this visit:    1. Routine general medical examination at a health care facility  -Medicare AWV done    2. Depression, unspecified depression type  - sertraline (ZOLOFT) 25 MG tablet; Take 1 tablet by mouth daily  Dispense: 30 tablet; Refill: 0    3. Anxiety  - sertraline (ZOLOFT) 25 MG tablet; Take 1 tablet by mouth daily  Dispense: 30 tablet; Refill: 0    4. Positive depression screening  - Positive Screen for Clinical Depression with a Documented Follow-up Plan   On the basis of positive PHQ-9 screening (PHQ-9 Total Score: 21), the following plan was implemented: medication prescribed - patient will call for any significant medication side effects or worsening symptoms of depression. Patient will follow-up in 4 week(s) with PCP. 5. Right arm pain  - XR HUMERUS RIGHT (MIN 2 VIEWS); Future  - Tylenol 650mg 3 times daily as needed      Return in 4 weeks (on 12/8/2020) for depression and anxiety.

## 2020-11-10 NOTE — PATIENT INSTRUCTIONS
Personalized Preventive Plan for Milly Beauchamp - 11/10/2020  Medicare offers a range of preventive health benefits. Some of the tests and screenings are paid in full while other may be subject to a deductible, co-insurance, and/or copay. Some of these benefits include a comprehensive review of your medical history including lifestyle, illnesses that may run in your family, and various assessments and screenings as appropriate. After reviewing your medical record and screening and assessments performed today your provider may have ordered immunizations, labs, imaging, and/or referrals for you. A list of these orders (if applicable) as well as your Preventive Care list are included within your After Visit Summary for your review. Other Preventive Recommendations:    · A preventive eye exam performed by an eye specialist is recommended every 1-2 years to screen for glaucoma; cataracts, macular degeneration, and other eye disorders. · A preventive dental visit is recommended every 6 months. · Try to get at least 150 minutes of exercise per week or 10,000 steps per day on a pedometer . · Order or download the FREE \"Exercise & Physical Activity: Your Everyday Guide\" from [employer]. Call 2-606.260.9705 or search [employer] online. · You need 0587-7088 mg of calcium and 8290-5517 IU of vitamin D per day. It is possible to meet your calcium requirement with diet alone, but a vitamin D supplement is usually necessary to meet this goal.  · When exposed to the sun, use a sunscreen that protects against both UVA and UVB radiation with an SPF of 30 or greater. Reapply every 2 to 3 hours or after sweating, drying off with a towel, or swimming. Always wear a seat belt when traveling in a car. Always wear a helmet when riding a bicycle or motorcycle. 1. Routine general medical examination at a health care facility  -Medicare AWV done    2.  Depression, unspecified depression type  - sertraline (ZOLOFT) 25 MG tablet; Take 1 tablet by mouth daily  Dispense: 30 tablet; Refill: 0    3. Anxiety  - sertraline (ZOLOFT) 25 MG tablet; Take 1 tablet by mouth daily  Dispense: 30 tablet; Refill: 0    4. Positive depression screening  - Positive Screen for Clinical Depression with a Documented Follow-up Plan     5. Right arm pain  - XR HUMERUS RIGHT (MIN 2 VIEWS);  Future  - Tylenol 650mg 3 times daily as needed

## 2020-11-10 NOTE — TELEPHONE ENCOUNTER
[facility] received a call from:  on Aging    Name of Caller: Salo Shows    Relationship to patient:     Organization name:  on Aging    Best contact number: [phone]    Reason for call: Sent orders on 10/28 to get patient into the Assisted Living program and has not heard back.

## 2020-11-11 NOTE — TELEPHONE ENCOUNTER
I did sign the paperwork on 10/29/20 with instructions to fax paperwork. Whoever faxed the paperwork the confirmation sheet says no answer so the fax didn't go through but was scanned in the patient's chart so likely this is the reason [place] [facility] didn't receive the paperwork.

## 2020-11-17 PROBLEM — M79.601 RIGHT ARM PAIN: Status: ACTIVE | Noted: 2020-11-17

## 2020-11-17 PROBLEM — F41.9 ANXIETY: Status: ACTIVE | Noted: 2020-11-17

## 2020-11-17 PROBLEM — F32.A DEPRESSION: Status: ACTIVE | Noted: 2020-11-17

## 2020-12-07 RX ORDER — SERTRALINE HYDROCHLORIDE 25 MG/1
TABLET, FILM COATED ORAL
Qty: 30 TABLET | Refills: 1 | Status: SHIPPED | OUTPATIENT
Start: 2020-12-07 | End: 2021-01-07

## 2021-01-07 DIAGNOSIS — F32.A DEPRESSION, UNSPECIFIED DEPRESSION TYPE: ICD-10-CM

## 2021-01-07 DIAGNOSIS — F41.9 ANXIETY: ICD-10-CM

## 2021-01-07 RX ORDER — SERTRALINE HYDROCHLORIDE 25 MG/1
TABLET, FILM COATED ORAL
Qty: 30 TABLET | Refills: 1 | Status: SHIPPED | OUTPATIENT
Start: 2021-01-07

## 2021-01-07 NOTE — TELEPHONE ENCOUNTER
Medication:   Requested Prescriptions     Pending Prescriptions Disp Refills    sertraline (ZOLOFT) 25 MG tablet [Pharmacy Med Name: SERTRALINE HCL 25 MG TABLET] 30 tablet 1     Sig: TAKE 1 TABLET BY MOUTH EVERY DAY     Last Filled: 12.7.20    Last appt: 11/10/2020   Next appt: Visit date not found    Last OARRS: No flowsheet data found.

## 2021-01-30 DIAGNOSIS — M85.9 DISORDER OF BONE DENSITY AND STRUCTURE, UNSPECIFIED: ICD-10-CM

## 2021-01-30 DIAGNOSIS — M43.9 COMPRESSION DEFORMITY OF VERTEBRA: ICD-10-CM

## 2021-02-01 RX ORDER — ALENDRONATE SODIUM 70 MG/1
TABLET ORAL
Qty: 12 TABLET | Refills: 1 | Status: SHIPPED | OUTPATIENT
Start: 2021-02-01

## 2021-02-01 NOTE — TELEPHONE ENCOUNTER
Medication:   Requested Prescriptions     Pending Prescriptions Disp Refills    alendronate (FOSAMAX) 70 MG tablet [Pharmacy Med Name: ALENDRONATE SODIUM 70 MG TAB] 12 tablet 1     Sig: TAKE 1 TABLET BY MOUTH ONE TIME PER WEEK        Last Filled:      Patient Phone Number: [phone] (home)     Last appt: 11/10/2020   Next appt: Visit date not found    Last OARRS: No flowsheet data found. Preferred Pharmacy:   [address] [facility]  [address]   [place] [facility] [address]  Phone: [phone] Fax: [phone]